# Patient Record
Sex: FEMALE | Race: WHITE | URBAN - METROPOLITAN AREA
[De-identification: names, ages, dates, MRNs, and addresses within clinical notes are randomized per-mention and may not be internally consistent; named-entity substitution may affect disease eponyms.]

---

## 2021-06-10 ENCOUNTER — OFFICE VISIT (OUTPATIENT)
Dept: ORTHOPEDIC SURGERY | Age: 21
End: 2021-06-10
Payer: COMMERCIAL

## 2021-06-10 VITALS
RESPIRATION RATE: 12 BRPM | SYSTOLIC BLOOD PRESSURE: 133 MMHG | BODY MASS INDEX: 22.45 KG/M2 | DIASTOLIC BLOOD PRESSURE: 92 MMHG | WEIGHT: 122 LBS | HEART RATE: 85 BPM | HEIGHT: 62 IN

## 2021-06-10 DIAGNOSIS — S93.622A LISFRANC'S SPRAIN, LEFT, INITIAL ENCOUNTER: ICD-10-CM

## 2021-06-10 DIAGNOSIS — M79.672 LEFT FOOT PAIN: Primary | ICD-10-CM

## 2021-06-10 PROCEDURE — 99203 OFFICE O/P NEW LOW 30 MIN: CPT | Performed by: ORTHOPAEDIC SURGERY

## 2021-06-10 RX ORDER — DESOGESTREL AND ETHINYL ESTRADIOL 0.15-0.03
KIT ORAL
COMMUNITY
Start: 2021-05-22

## 2021-06-10 ASSESSMENT — ENCOUNTER SYMPTOMS
RESPIRATORY NEGATIVE: 1
GASTROINTESTINAL NEGATIVE: 1
EYES NEGATIVE: 1
ALLERGIC/IMMUNOLOGIC COMMENTS: SEASONAL

## 2021-06-10 NOTE — PROGRESS NOTES
pulses are palpable and 2+. Neurological: The patient has good coordination. There is no weakness or sensory deficit. Skin:    Head/Neck: inspection reveals no rashes, ulcerations or lesions. Trunk:  inspection reveals no rashes, ulcerations or lesions. Right Lower Extremity: inspection reveals no rashes, ulcerations or lesions. Left Lower Extremity: inspection reveals no rashes, ulcerations or lesions. Right foot exam is normal.  She has normal neurovascular exam and no tenderness. On the left side she has significant pain throughout the lateral midfoot and pain over the peroneal tendons. She does not have significant swelling. She has moderate pain at the Lisfranc joint but no ecchymosis. Neurologic and vascular exams are normal and she has no instability. Calf is soft. Left foot x-rays obtained the office today and interpreted by me AP, lateral, and oblique views demonstrate: No acute bony abnormalities      Assessment:      Persistent left midfoot pain after soccer injury with concern for potential Lisfranc injury      Plan:      MRI scan left foot. This will allow me to determine if she needs immobilization, therapy, or even surgery. Follow-up with me after the scan. This note was created using voice recognition software. It has been proofread, but occasionally errors remain. Please disregard these errors. They will be corrected as they are noted.

## 2021-06-11 ENCOUNTER — TELEPHONE (OUTPATIENT)
Dept: ORTHOPEDIC SURGERY | Age: 21
End: 2021-06-11

## 2021-06-11 NOTE — TELEPHONE ENCOUNTER
Called and left message for patient that there is no pre cert required for her MRI. She will need to call [phone] and speak with her insurance company or they will charge her a $200 penalty fee.

## 2021-06-17 ENCOUNTER — OFFICE VISIT (OUTPATIENT)
Dept: ORTHOPEDIC SURGERY | Age: 21
End: 2021-06-17
Payer: COMMERCIAL

## 2021-06-17 VITALS
SYSTOLIC BLOOD PRESSURE: 134 MMHG | RESPIRATION RATE: 12 BRPM | DIASTOLIC BLOOD PRESSURE: 87 MMHG | HEART RATE: 76 BPM | WEIGHT: 122 LBS | BODY MASS INDEX: 22.45 KG/M2 | HEIGHT: 62 IN

## 2021-06-17 DIAGNOSIS — M79.672 LEFT FOOT PAIN: Primary | ICD-10-CM

## 2021-06-17 PROCEDURE — 99212 OFFICE O/P EST SF 10 MIN: CPT | Performed by: ORTHOPAEDIC SURGERY

## 2021-06-17 NOTE — PROGRESS NOTES
Kellie Ugalde returns today for her left foot. She has no pain this morning at rest but as the day progresses, especially if she has been active, she can have pain that is as bad as 6 out of 10. Her pain is principally lateral when it occurs. Patient's medications, allergies, past medical, surgical, social and family histories were reviewed and updated as appropriate. Relevant review of systems reviewed. General Exam:    Vitals: Blood pressure 134/87, pulse 76, resp. rate 12, height 5' 2\" (1.575 m), weight 122 lb (55.3 kg). Constitutional: Patient is adequately groomed with no evidence of malnutrition  Mental Status: The patient is oriented to time, place and person. The patient's mood and affect are appropriate. Left foot today has no discrete tenderness laterally. She has full motion. She has some mild midfoot discomfort. Calf is soft. Neurologic and vascular exams are normal.    Left foot MRI is reviewed. It demonstrates:  HISTORY:  Foot pain, trauma 04/15/2021.       TECHNICAL FACTORS:  Long- and short-axis fat- and water-weighted images were performed.       COMPARISON:  None.       FINDINGS:  Intact Lisfranc ligament.  No macrotear.  No 2nd TMT malalignment.  No midfoot    stress injury.  No fracture.  No dislocation.       Flexor and extensor tendon complexes are intact.       No soft tissue mass.       CONCLUSION:   1. No midfoot sprain. No Lisfranc tear. No malalignment, stress injury or fracture. 2. Please see above. I reviewed these findings on the report and the images with the patient. Assessment: Dynamic left foot pain. Plan: I believe she would benefit from physical therapy. We will make that referral.  If she has ongoing issues in 6 weeks I should see her back. She agrees. This note was created using voice recognition software. It has been proofread, but occasionally errors remain. Please disregard these errors. They will be corrected as they are noted.